# Patient Record
Sex: MALE | URBAN - METROPOLITAN AREA
[De-identification: names, ages, dates, MRNs, and addresses within clinical notes are randomized per-mention and may not be internally consistent; named-entity substitution may affect disease eponyms.]

---

## 2020-08-10 ENCOUNTER — APPOINTMENT (RX ONLY)
Dept: URBAN - METROPOLITAN AREA CLINIC 81 | Facility: CLINIC | Age: 30
Setting detail: DERMATOLOGY
End: 2020-08-10

## 2020-08-10 DIAGNOSIS — L29.89 OTHER PRURITUS: ICD-10-CM

## 2020-08-10 DIAGNOSIS — L29.8 OTHER PRURITUS: ICD-10-CM

## 2020-08-10 DIAGNOSIS — R21 RASH AND OTHER NONSPECIFIC SKIN ERUPTION: ICD-10-CM

## 2020-08-10 PROCEDURE — ? DIAGNOSIS COMMENT

## 2020-08-10 PROCEDURE — ? PRESCRIPTION

## 2020-08-10 PROCEDURE — ? COUNSELING

## 2020-08-10 PROCEDURE — ? MEDICATION COUNSELING

## 2020-08-10 PROCEDURE — 99203 OFFICE O/P NEW LOW 30 MIN: CPT

## 2020-08-10 PROCEDURE — ? PATIENT SPECIFIC COUNSELING

## 2020-08-10 RX ORDER — TRIAMCINOLONE ACETONIDE 1 MG/G
CREAM TOPICAL BID
Qty: 1 | Refills: 2 | Status: ERX | COMMUNITY
Start: 2020-08-10

## 2020-08-10 RX ORDER — PREDNISONE 10 MG/1
TABLET ORAL
Qty: 154 | Refills: 0 | Status: ERX | COMMUNITY
Start: 2020-08-10

## 2020-08-10 RX ADMIN — TRIAMCINOLONE ACETONIDE: 1 CREAM TOPICAL at 00:00

## 2020-08-10 RX ADMIN — PREDNISONE: 10 TABLET ORAL at 00:00

## 2020-08-10 ASSESSMENT — LOCATION DETAILED DESCRIPTION DERM
LOCATION DETAILED: LEFT DISTAL DORSAL FOREARM
LOCATION DETAILED: RIGHT RADIAL DORSAL HAND
LOCATION DETAILED: RIGHT PROXIMAL DORSAL FOREARM
LOCATION DETAILED: LEFT PROXIMAL PRETIBIAL REGION
LOCATION DETAILED: LEFT RADIAL DORSAL HAND
LOCATION DETAILED: RIGHT PROXIMAL PRETIBIAL REGION

## 2020-08-10 ASSESSMENT — LOCATION SIMPLE DESCRIPTION DERM
LOCATION SIMPLE: RIGHT PRETIBIAL REGION
LOCATION SIMPLE: LEFT HAND
LOCATION SIMPLE: LEFT PRETIBIAL REGION
LOCATION SIMPLE: LEFT FOREARM
LOCATION SIMPLE: RIGHT FOREARM
LOCATION SIMPLE: RIGHT HAND

## 2020-08-10 ASSESSMENT — LOCATION ZONE DERM
LOCATION ZONE: LEG
LOCATION ZONE: HAND
LOCATION ZONE: ARM

## 2020-08-10 NOTE — PROCEDURE: DIAGNOSIS COMMENT
Comment: Patient with a pruritic skin eruption involving arms, legs, trunk, that has been going on for the past month. 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 Patient states that he has sought treatment in the past and has received topical MUPIROCIN which patient states did not work in controlling the symptoms as well as “an injection” which only helped temporarily. \\nPatient’s main complaint is itching. \\nHe denies any difficulty breathing or swallowing, denies any abdominal pain, urinary or gastrointestinal symptoms, denies any fever, denies any recent travel or new medications.\\n\\nToday we will treat as follows:\\n\\n1) Long course of tapering PREDNISONE starting at 60 mg and tapering 10 mg every week.\\n2) TRIAMCINOLONE 0.1% crm. For patient to use twice daily following at two weeks on two weeks off schedule.\\n3) Instructed patient to return to clinic if symptoms do not improve or worsen despite above intervention.
Detail Level: Simple

## 2020-11-02 ENCOUNTER — APPOINTMENT (RX ONLY)
Dept: URBAN - METROPOLITAN AREA CLINIC 81 | Facility: CLINIC | Age: 30
Setting detail: DERMATOLOGY
End: 2020-11-02

## 2020-11-02 DIAGNOSIS — L29.8 OTHER PRURITUS: ICD-10-CM

## 2020-11-02 DIAGNOSIS — R21 RASH AND OTHER NONSPECIFIC SKIN ERUPTION: ICD-10-CM

## 2020-11-02 DIAGNOSIS — L29.89 OTHER PRURITUS: ICD-10-CM

## 2020-11-02 PROCEDURE — ? DIAGNOSIS COMMENT

## 2020-11-02 PROCEDURE — 99214 OFFICE O/P EST MOD 30 MIN: CPT

## 2020-11-02 PROCEDURE — ? PATIENT SPECIFIC COUNSELING

## 2020-11-02 PROCEDURE — ? PRESCRIPTION

## 2020-11-02 PROCEDURE — ? COUNSELING

## 2020-11-02 PROCEDURE — ? MEDICATION COUNSELING

## 2020-11-02 RX ORDER — PREDNISONE 10 MG/1
TABLET ORAL
Qty: 154 | Refills: 1 | Status: ERX

## 2020-11-02 RX ORDER — TRIAMCINOLONE ACETONIDE 1 MG/G
CREAM TOPICAL BID
Qty: 1 | Refills: 4 | Status: ERX

## 2020-11-02 ASSESSMENT — LOCATION SIMPLE DESCRIPTION DERM
LOCATION SIMPLE: RIGHT HAND
LOCATION SIMPLE: LEFT PRETIBIAL REGION
LOCATION SIMPLE: RIGHT FOREARM
LOCATION SIMPLE: LEFT HAND
LOCATION SIMPLE: LEFT FOREARM
LOCATION SIMPLE: RIGHT PRETIBIAL REGION

## 2020-11-02 ASSESSMENT — LOCATION DETAILED DESCRIPTION DERM
LOCATION DETAILED: LEFT PROXIMAL PRETIBIAL REGION
LOCATION DETAILED: RIGHT PROXIMAL PRETIBIAL REGION
LOCATION DETAILED: RIGHT RADIAL DORSAL HAND
LOCATION DETAILED: RIGHT PROXIMAL DORSAL FOREARM
LOCATION DETAILED: LEFT RADIAL DORSAL HAND
LOCATION DETAILED: LEFT DISTAL DORSAL FOREARM

## 2020-11-02 ASSESSMENT — LOCATION ZONE DERM
LOCATION ZONE: LEG
LOCATION ZONE: HAND
LOCATION ZONE: ARM

## 2020-11-02 NOTE — PROCEDURE: DIAGNOSIS COMMENT
Comment: Followed is diagnosis comment from previous visit.  Patient here today with same symptoms\\n\\nPatient with a pruritic skin eruption involving arms, legs, trunk, that has been going on for the past month. 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 Patient states that he has sought treatment in the past and has received topical MUPIROCIN which patient states did not work in controlling the symptoms as well as “an injection” which only helped temporarily. \\nPatient’s main complaint is itching. \\nHe denies any difficulty breathing or swallowing, denies any abdominal pain, urinary or gastrointestinal symptoms, denies any fever, denies any recent travel or new medications.\\n\\nToday we will treat as follows which is the same treatment given to patient during last and he started worked well.\\n\\n1) Long course of tapering PREDNISONE starting at 60 mg and tapering 10 mg every week.\\n2) TRIAMCINOLONE 0.1% crm. For patient to use twice daily following at two weeks on two weeks off schedule.\\n3) Instructed patient to return to clinic if symptoms do not improve or worsen despite above intervention.\\n4) Clinically patient’s clinical appearance is consistent with like in planus with evidence of a single ulceration on the left posterior buccal mucosa which patient states he is not aware of. We discussed initiation of antimalarial oral medication if symptoms continue their intermittent consistent flare ups. We also discussed a skin biopsy in a future visit if symptoms recur.
Detail Level: Simple

## 2021-01-28 ENCOUNTER — APPOINTMENT (RX ONLY)
Dept: URBAN - METROPOLITAN AREA CLINIC 81 | Facility: CLINIC | Age: 31
Setting detail: DERMATOLOGY
End: 2021-01-28

## 2021-01-28 ENCOUNTER — RX ONLY (OUTPATIENT)
Age: 31
Setting detail: RX ONLY
End: 2021-01-28

## 2021-01-28 DIAGNOSIS — L43.8 OTHER LICHEN PLANUS: ICD-10-CM

## 2021-01-28 PROBLEM — L30.9 DERMATITIS, UNSPECIFIED: Status: ACTIVE | Noted: 2021-01-28

## 2021-01-28 PROCEDURE — ? DIAGNOSIS COMMENT

## 2021-01-28 PROCEDURE — 11104 PUNCH BX SKIN SINGLE LESION: CPT

## 2021-01-28 PROCEDURE — ? COUNSELING

## 2021-01-28 PROCEDURE — ? BIOPSY BY PUNCH METHOD

## 2021-01-28 RX ORDER — PREDNISONE 10 MG/1
TABLET ORAL
Qty: 80 | Refills: 0 | Status: ERX

## 2021-01-28 ASSESSMENT — LOCATION DETAILED DESCRIPTION DERM: LOCATION DETAILED: RIGHT INFERIOR UPPER BACK

## 2021-01-28 ASSESSMENT — LOCATION SIMPLE DESCRIPTION DERM: LOCATION SIMPLE: RIGHT UPPER BACK

## 2021-01-28 ASSESSMENT — LOCATION ZONE DERM: LOCATION ZONE: TRUNK

## 2021-01-28 NOTE — PROCEDURE: DIAGNOSIS COMMENT
Detail Level: Simple
Render Risk Assessment In Note?: no
Comment: Patient with a pruritic skin eruption involving arms, legs, trunk, that has been going on for the past\\nseveral months.\\n\\nPatient states that he has sought treatment in the past and has received topical MUPIROCIN which patient states did not work in controlling the symptoms as well as “an injection” which only helped temporarily.\\nPatient’s main complaint is itching.\\nHe denies any difficulty breathing or swallowing, denies any abdominal pain, urinary or\\ngastrointestinal symptoms, denies any fever, denies any recent travel or new medications.\\n\\n1) patient was prescribed in previous visit a long course of tapering PREDNISONE starting at 60 mg and tapering 10 mg every week which provided him with significant relief of itching and resolution of rash.  This was November 2020. Today patient returns with exacerbation of symptoms of rash and itching involving legs, arms, trunk a surface area well above 10% of body.  \\n2) patient has also being using TRIAMCINOLONE 0.1% crm with some but temporary only relief of symptoms if used as mono therapy.  \\n3) Today a 4mm skin punch biopsy was performed on a resoresentative lesion on patients back to help rule out atopic dermatitis which is now the diagnosis I am favoring versus lichen planus which was a working diagnosis based on clinical appearance and information from past visits.  Today patient also volunteered the information that even as a child he had “sensitive” skin\\n4) Today we will also re-initiate a tapering dose of prednisone starting at 40 mg once daily for seven days and decreasing the dosage by 10 mg every week.\\n5) Pending biopsy results we will discuss long-term maintenance of patient’s condition.

## 2021-02-18 ENCOUNTER — RX ONLY (OUTPATIENT)
Age: 31
Setting detail: RX ONLY
End: 2021-02-18

## 2021-02-18 RX ORDER — TRIAMCINOLONE ACETONIDE 1 MG/G
CREAM TOPICAL
Qty: 1 | Refills: 4 | Status: ERX
